# Patient Record
Sex: MALE | Race: BLACK OR AFRICAN AMERICAN | NOT HISPANIC OR LATINO | Employment: STUDENT | URBAN - METROPOLITAN AREA
[De-identification: names, ages, dates, MRNs, and addresses within clinical notes are randomized per-mention and may not be internally consistent; named-entity substitution may affect disease eponyms.]

---
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
## 2021-09-07 NOTE — ED NOTES
Health Maintenance Due   Topic Date Due   • Influenza Vaccine (1) 09/01/2021       Patient is due for topics as listed above but is not proceeding with Immunization(s) Influenza at this time. Education provided for Immunization(s) Influenza.         Pt here c/o rash and itching to left hand fingers,abd and inner thighs. denies fevers,no drainage from areas. Raised red bumps that are red/inflammed. No other problems. S/s started when pt came back home from [employer] for the Summer.